# Patient Record
Sex: FEMALE | Employment: OTHER | URBAN - METROPOLITAN AREA
[De-identification: names, ages, dates, MRNs, and addresses within clinical notes are randomized per-mention and may not be internally consistent; named-entity substitution may affect disease eponyms.]

---

## 2023-03-18 DIAGNOSIS — I10 ESSENTIAL (PRIMARY) HYPERTENSION: ICD-10-CM

## 2023-03-19 DIAGNOSIS — I10 ESSENTIAL (PRIMARY) HYPERTENSION: ICD-10-CM

## 2023-03-20 RX ORDER — EPINEPHRINE 0.3 MG/.3ML
0.3 INJECTION SUBCUTANEOUS
COMMUNITY
Start: 2017-11-06 | End: 2023-10-26

## 2023-03-20 RX ORDER — CETIRIZINE HYDROCHLORIDE 10 MG/1
10 TABLET ORAL
COMMUNITY
Start: 2013-12-19 | End: 2024-01-09 | Stop reason: DRUGHIGH

## 2023-03-20 RX ORDER — FLAXSEED OIL 1000 MG
2 CAPSULE ORAL
COMMUNITY
Start: 2017-03-28 | End: 2024-01-09 | Stop reason: WASHOUT

## 2023-03-20 RX ORDER — METOPROLOL SUCCINATE 200 MG/1
200 TABLET, EXTENDED RELEASE ORAL DAILY
COMMUNITY
End: 2023-03-20 | Stop reason: SDUPTHER

## 2023-03-20 RX ORDER — METOPROLOL SUCCINATE 200 MG/1
TABLET, EXTENDED RELEASE ORAL
Qty: 90 TABLET | Refills: 3 | Status: SHIPPED | OUTPATIENT
Start: 2023-03-20 | End: 2024-01-08

## 2023-03-20 RX ORDER — CHLORTHALIDONE 25 MG/1
25 TABLET ORAL DAILY
COMMUNITY
End: 2023-03-20 | Stop reason: SDUPTHER

## 2023-03-20 RX ORDER — FLUTICASONE PROPIONATE 50 MCG
1 SPRAY, SUSPENSION (ML) NASAL DAILY
COMMUNITY
Start: 2017-03-28

## 2023-03-20 RX ORDER — BEPOTASTINE BESILATE 15 MG/ML
1 SOLUTION/ DROPS OPHTHALMIC 2 TIMES DAILY
COMMUNITY
Start: 2020-08-19 | End: 2024-01-09 | Stop reason: WASHOUT

## 2023-03-20 RX ORDER — LISINOPRIL 40 MG/1
20 TABLET ORAL 2 TIMES DAILY
COMMUNITY
End: 2023-07-17 | Stop reason: DRUGHIGH

## 2023-03-20 RX ORDER — HYDRALAZINE HYDROCHLORIDE 50 MG/1
50 TABLET, FILM COATED ORAL 3 TIMES DAILY
COMMUNITY
End: 2023-04-17

## 2023-03-20 RX ORDER — FUROSEMIDE 20 MG/1
1 TABLET ORAL DAILY
COMMUNITY
Start: 2022-04-10 | End: 2024-01-09 | Stop reason: WASHOUT

## 2023-03-20 RX ORDER — DOXYCYCLINE 100 MG/1
100 CAPSULE ORAL
COMMUNITY
Start: 2022-06-27

## 2023-03-20 RX ORDER — NYSTATIN 100000 [USP'U]/G
1 POWDER TOPICAL AS NEEDED
COMMUNITY
Start: 2022-08-09

## 2023-03-20 RX ORDER — LOTEPREDNOL ETABONATE 2 MG/ML
1 SUSPENSION/ DROPS OPHTHALMIC 2 TIMES DAILY
COMMUNITY
Start: 2020-08-19 | End: 2024-01-09 | Stop reason: WASHOUT

## 2023-03-20 RX ORDER — AMMONIUM LACTATE 12 G/100G
1 LOTION TOPICAL AS NEEDED
COMMUNITY
Start: 2022-08-09

## 2023-03-20 RX ORDER — KETOCONAZOLE 20 MG/G
1 CREAM TOPICAL DAILY
COMMUNITY
Start: 2022-08-09

## 2023-03-20 RX ORDER — DOCUSATE SODIUM 100 MG/1
100 CAPSULE, LIQUID FILLED ORAL
COMMUNITY
Start: 2013-12-19

## 2023-03-20 RX ORDER — AMLODIPINE BESYLATE 10 MG/1
10 TABLET ORAL DAILY
COMMUNITY
End: 2023-08-03

## 2023-03-20 RX ORDER — MONTELUKAST SODIUM 4 MG/1
2 TABLET, CHEWABLE ORAL DAILY
COMMUNITY
Start: 2018-04-02 | End: 2024-01-09 | Stop reason: WASHOUT

## 2023-03-20 RX ORDER — ATORVASTATIN CALCIUM 20 MG/1
1 TABLET, FILM COATED ORAL DAILY
COMMUNITY
Start: 2022-12-25

## 2023-03-20 RX ORDER — CHLORTHALIDONE 25 MG/1
TABLET ORAL
Qty: 90 TABLET | Refills: 1 | Status: SHIPPED | OUTPATIENT
Start: 2023-03-20 | End: 2023-10-30

## 2023-04-16 DIAGNOSIS — I10 ESSENTIAL (PRIMARY) HYPERTENSION: ICD-10-CM

## 2023-04-17 RX ORDER — HYDRALAZINE HYDROCHLORIDE 50 MG/1
TABLET, FILM COATED ORAL
Qty: 270 TABLET | Refills: 3 | Status: SHIPPED | OUTPATIENT
Start: 2023-04-17 | End: 2024-01-09 | Stop reason: DRUGHIGH

## 2023-07-17 ENCOUNTER — OFFICE VISIT (OUTPATIENT)
Dept: PRIMARY CARE | Facility: CLINIC | Age: 68
End: 2023-07-17
Payer: COMMERCIAL

## 2023-07-17 DIAGNOSIS — R73.03 PREDIABETES: ICD-10-CM

## 2023-07-17 DIAGNOSIS — I10 BENIGN ESSENTIAL HYPERTENSION: ICD-10-CM

## 2023-07-17 DIAGNOSIS — I10 PRIMARY HYPERTENSION: Primary | ICD-10-CM

## 2023-07-17 PROBLEM — H11.30 SUBCONJUNCTIVAL HEMORRHAGE: Status: ACTIVE | Noted: 2023-07-17

## 2023-07-17 PROBLEM — L71.9 ROSACEA: Status: ACTIVE | Noted: 2023-07-17

## 2023-07-17 PROBLEM — F43.9 STRESS: Status: ACTIVE | Noted: 2023-07-17

## 2023-07-17 PROBLEM — T78.40XA: Status: ACTIVE | Noted: 2023-07-17

## 2023-07-17 PROBLEM — R82.90 ABNORMAL FINDING IN URINE: Status: ACTIVE | Noted: 2023-07-17

## 2023-07-17 PROBLEM — R06.83 SNORING: Status: ACTIVE | Noted: 2023-07-17

## 2023-07-17 PROBLEM — E66.9 OBESITY: Status: ACTIVE | Noted: 2023-07-17

## 2023-07-17 PROBLEM — C43.62 MALIGNANT MELANOMA OF LEFT UPPER EXTREMITY INCLUDING SHOULDER (MULTI): Status: ACTIVE | Noted: 2023-07-17

## 2023-07-17 PROBLEM — C44.91 CARCINOMAS, BASAL CELL: Status: ACTIVE | Noted: 2023-07-17

## 2023-07-17 PROBLEM — M81.0 OSTEOPOROSIS: Status: ACTIVE | Noted: 2023-07-17

## 2023-07-17 PROBLEM — J01.00 SINUSITIS, ACUTE, MAXILLARY: Status: RESOLVED | Noted: 2023-07-17 | Resolved: 2023-07-17

## 2023-07-17 PROBLEM — E78.00 HYPERCHOLESTEROLEMIA: Status: ACTIVE | Noted: 2023-07-17

## 2023-07-17 PROBLEM — M85.80 OSTEOPENIA: Status: ACTIVE | Noted: 2023-07-17

## 2023-07-17 PROBLEM — E66.3 OVERWEIGHT: Status: ACTIVE | Noted: 2023-07-17

## 2023-07-17 PROBLEM — M17.9 OSTEOARTHRITIS OF KNEE: Status: ACTIVE | Noted: 2023-07-17

## 2023-07-17 PROBLEM — L73.2 HIDRADENITIS SUPPURATIVA: Status: RESOLVED | Noted: 2023-07-17 | Resolved: 2023-07-17

## 2023-07-17 PROBLEM — T78.2XXA ANAPHYLAXIS: Status: ACTIVE | Noted: 2023-07-17

## 2023-07-17 PROBLEM — N95.1 MENOPAUSAL HOT FLUSHES: Status: ACTIVE | Noted: 2023-07-17

## 2023-07-17 PROBLEM — L23.1 CONTACT DERMATITIS DUE TO ADHESIVES: Status: ACTIVE | Noted: 2023-07-17

## 2023-07-17 PROBLEM — L73.9 FOLLICULITIS: Status: RESOLVED | Noted: 2023-07-17 | Resolved: 2023-07-17

## 2023-07-17 PROBLEM — M72.2 PLANTAR FASCIITIS: Status: RESOLVED | Noted: 2023-07-17 | Resolved: 2023-07-17

## 2023-07-17 PROBLEM — R93.1 AGATSTON CORONARY ARTERY CALCIUM SCORE LESS THAN 100: Status: ACTIVE | Noted: 2023-07-17

## 2023-07-17 PROBLEM — J06.9 URTI (ACUTE UPPER RESPIRATORY INFECTION): Status: ACTIVE | Noted: 2023-07-17

## 2023-07-17 PROBLEM — B02.9 SHINGLES: Status: RESOLVED | Noted: 2023-07-17 | Resolved: 2023-07-17

## 2023-07-17 PROBLEM — L02.92 BOIL: Status: ACTIVE | Noted: 2023-07-17

## 2023-07-17 PROBLEM — M75.80 ROTATOR CUFF TENDONITIS: Status: RESOLVED | Noted: 2023-07-17 | Resolved: 2023-07-17

## 2023-07-17 PROBLEM — B37.2 CANDIDAL INTERTRIGO: Status: ACTIVE | Noted: 2023-07-17

## 2023-07-17 LAB
ALBUMIN (G/DL) IN SER/PLAS: 4.3 G/DL (ref 3.4–5)
ANION GAP IN SER/PLAS: 14 MMOL/L (ref 10–20)
CALCIUM (MG/DL) IN SER/PLAS: 9.4 MG/DL (ref 8.6–10.6)
CARBON DIOXIDE, TOTAL (MMOL/L) IN SER/PLAS: 28 MMOL/L (ref 21–32)
CHLORIDE (MMOL/L) IN SER/PLAS: 104 MMOL/L (ref 98–107)
CREATININE (MG/DL) IN SER/PLAS: 0.79 MG/DL (ref 0.5–1.05)
ESTIMATED AVERAGE GLUCOSE FOR HBA1C: 120 MG/DL
GFR FEMALE: 82 ML/MIN/1.73M2
GLUCOSE (MG/DL) IN SER/PLAS: 94 MG/DL (ref 74–99)
HEMOGLOBIN A1C/HEMOGLOBIN TOTAL IN BLOOD: 5.8 %
PHOSPHATE (MG/DL) IN SER/PLAS: 3.4 MG/DL (ref 2.5–4.9)
POTASSIUM (MMOL/L) IN SER/PLAS: 3.8 MMOL/L (ref 3.5–5.3)
SODIUM (MMOL/L) IN SER/PLAS: 142 MMOL/L (ref 136–145)
UREA NITROGEN (MG/DL) IN SER/PLAS: 23 MG/DL (ref 6–23)

## 2023-07-17 PROCEDURE — 3074F SYST BP LT 130 MM HG: CPT | Performed by: INTERNAL MEDICINE

## 2023-07-17 PROCEDURE — 1160F RVW MEDS BY RX/DR IN RCRD: CPT | Performed by: INTERNAL MEDICINE

## 2023-07-17 PROCEDURE — 3078F DIAST BP <80 MM HG: CPT | Performed by: INTERNAL MEDICINE

## 2023-07-17 PROCEDURE — 80069 RENAL FUNCTION PANEL: CPT

## 2023-07-17 PROCEDURE — 1036F TOBACCO NON-USER: CPT | Performed by: INTERNAL MEDICINE

## 2023-07-17 PROCEDURE — 99213 OFFICE O/P EST LOW 20 MIN: CPT | Performed by: INTERNAL MEDICINE

## 2023-07-17 PROCEDURE — 83036 HEMOGLOBIN GLYCOSYLATED A1C: CPT

## 2023-07-17 PROCEDURE — 1126F AMNT PAIN NOTED NONE PRSNT: CPT | Performed by: INTERNAL MEDICINE

## 2023-07-17 PROCEDURE — 1159F MED LIST DOCD IN RCRD: CPT | Performed by: INTERNAL MEDICINE

## 2023-07-17 RX ORDER — LISINOPRIL 40 MG/1
40 TABLET ORAL DAILY
Qty: 90 TABLET | Refills: 0 | Status: SHIPPED | OUTPATIENT
Start: 2023-07-17 | End: 2023-11-30

## 2023-07-17 NOTE — PROGRESS NOTES
Subjective   Patient ID: Frida Dailey is a 68 y.o. female who presents for Follow-up.  HPI  Cardiology  /52  No cardiovascular pulmonary or neurologic symptoms  Donated  blood site residual   Derm F/U   Mamm     Review of Systems   Constitutional:  Negative for fever.   Respiratory: Negative.  Negative for shortness of breath.    Cardiovascular:  Negative for chest pain, palpitations and leg swelling.       Objective   Physical Exam  Constitutional:       Appearance: Normal appearance. She is obese.   Neck:      Vascular: No carotid bruit.      Comments: No JVD or thyromegaly  Cardiovascular:      Rate and Rhythm: Normal rate and regular rhythm.      Heart sounds: Normal heart sounds. No murmur heard.  Pulmonary:      Effort: Pulmonary effort is normal.   Musculoskeletal:      Right lower leg: No edema.      Left lower leg: No edema.   Neurological:      General: No focal deficit present.      Mental Status: She is alert.   Psychiatric:         Mood and Affect: Mood normal.       /76   Pulse 72   Resp 14     Data  Cardiology consult 7/11/2023 reviewed-doing well  Sinusitis 5/24  Mammogram 4/4/2023 satisfactory  Hemoglobin A1c 5.8% on 1/28/2023    Assessment/Plan   Problem List Items Addressed This Visit       Benign essential hypertension - Primary    Relevant Orders    Renal Function Panel (Completed)    Prediabetes    Relevant Orders    Hemoglobin A1C (Completed)

## 2023-08-03 DIAGNOSIS — I10 ESSENTIAL (PRIMARY) HYPERTENSION: ICD-10-CM

## 2023-08-03 RX ORDER — AMLODIPINE BESYLATE 10 MG/1
10 TABLET ORAL DAILY
Qty: 90 TABLET | Refills: 3 | Status: SHIPPED | OUTPATIENT
Start: 2023-08-03

## 2023-09-12 VITALS — HEART RATE: 72 BPM | DIASTOLIC BLOOD PRESSURE: 76 MMHG | RESPIRATION RATE: 14 BRPM | SYSTOLIC BLOOD PRESSURE: 116 MMHG

## 2023-09-12 ASSESSMENT — ENCOUNTER SYMPTOMS
SHORTNESS OF BREATH: 0
FEVER: 0
PALPITATIONS: 0
RESPIRATORY NEGATIVE: 1

## 2023-10-19 DIAGNOSIS — T78.2XXA ANAPHYLACTIC SHOCK, UNSPECIFIED, INITIAL ENCOUNTER: ICD-10-CM

## 2023-10-26 RX ORDER — EPINEPHRINE 0.3 MG/.3ML
0.3 INJECTION SUBCUTANEOUS
Qty: 2 EACH | Refills: 2 | Status: SHIPPED | OUTPATIENT
Start: 2023-10-26

## 2023-10-30 DIAGNOSIS — I10 ESSENTIAL (PRIMARY) HYPERTENSION: ICD-10-CM

## 2023-10-30 RX ORDER — CHLORTHALIDONE 25 MG/1
TABLET ORAL
Qty: 90 TABLET | Refills: 1 | Status: SHIPPED | OUTPATIENT
Start: 2023-10-30

## 2023-10-31 ENCOUNTER — PATIENT MESSAGE (OUTPATIENT)
Dept: PRIMARY CARE | Facility: CLINIC | Age: 68
End: 2023-10-31

## 2023-11-30 DIAGNOSIS — I10 ESSENTIAL (PRIMARY) HYPERTENSION: ICD-10-CM

## 2023-11-30 RX ORDER — LISINOPRIL 40 MG/1
20 TABLET ORAL 2 TIMES DAILY
Qty: 90 TABLET | Refills: 3 | Status: SHIPPED | OUTPATIENT
Start: 2023-11-30 | End: 2024-01-09 | Stop reason: DRUGHIGH

## 2024-01-07 DIAGNOSIS — I10 ESSENTIAL (PRIMARY) HYPERTENSION: ICD-10-CM

## 2024-01-08 RX ORDER — METOPROLOL SUCCINATE 200 MG/1
TABLET, EXTENDED RELEASE ORAL
Qty: 90 TABLET | Refills: 3 | Status: SHIPPED | OUTPATIENT
Start: 2024-01-08

## 2024-01-09 ENCOUNTER — OFFICE VISIT (OUTPATIENT)
Dept: PRIMARY CARE | Facility: CLINIC | Age: 69
End: 2024-01-09
Payer: MEDICARE

## 2024-01-09 DIAGNOSIS — Z23 ENCOUNTER FOR IMMUNIZATION: ICD-10-CM

## 2024-01-09 DIAGNOSIS — R73.03 PREDIABETES: ICD-10-CM

## 2024-01-09 DIAGNOSIS — Z67.40 BLOOD TYPE O+: ICD-10-CM

## 2024-01-09 DIAGNOSIS — I10 ESSENTIAL (PRIMARY) HYPERTENSION: Primary | ICD-10-CM

## 2024-01-09 DIAGNOSIS — Z52.008 BLOOD DONOR: ICD-10-CM

## 2024-01-09 DIAGNOSIS — I10 PRIMARY HYPERTENSION: ICD-10-CM

## 2024-01-09 PROCEDURE — 3078F DIAST BP <80 MM HG: CPT | Performed by: INTERNAL MEDICINE

## 2024-01-09 PROCEDURE — 1126F AMNT PAIN NOTED NONE PRSNT: CPT | Performed by: INTERNAL MEDICINE

## 2024-01-09 PROCEDURE — G0009 ADMIN PNEUMOCOCCAL VACCINE: HCPCS | Performed by: INTERNAL MEDICINE

## 2024-01-09 PROCEDURE — 3074F SYST BP LT 130 MM HG: CPT | Performed by: INTERNAL MEDICINE

## 2024-01-09 PROCEDURE — 1160F RVW MEDS BY RX/DR IN RCRD: CPT | Performed by: INTERNAL MEDICINE

## 2024-01-09 PROCEDURE — 1036F TOBACCO NON-USER: CPT | Performed by: INTERNAL MEDICINE

## 2024-01-09 PROCEDURE — 90677 PCV20 VACCINE IM: CPT | Performed by: INTERNAL MEDICINE

## 2024-01-09 PROCEDURE — 1159F MED LIST DOCD IN RCRD: CPT | Performed by: INTERNAL MEDICINE

## 2024-01-09 PROCEDURE — 99214 OFFICE O/P EST MOD 30 MIN: CPT | Performed by: INTERNAL MEDICINE

## 2024-01-09 RX ORDER — CHOLECALCIFEROL (VITAMIN D3) 25 MCG
1000 TABLET ORAL
COMMUNITY
Start: 2011-06-06

## 2024-01-09 RX ORDER — FLAXSEED OIL 1000 MG
1000 CAPSULE ORAL 2 TIMES DAILY
COMMUNITY
Start: 2015-03-21

## 2024-01-09 RX ORDER — HYDRALAZINE HYDROCHLORIDE 50 MG/1
50 TABLET, FILM COATED ORAL 2 TIMES DAILY
COMMUNITY
Start: 2021-11-30 | End: 2024-05-28

## 2024-01-09 RX ORDER — FUROSEMIDE 20 MG/1
20 TABLET ORAL 2 TIMES DAILY
COMMUNITY
End: 2024-01-09 | Stop reason: ALTCHOICE

## 2024-01-09 RX ORDER — ASPIRIN 81 MG/1
81 TABLET ORAL
COMMUNITY
Start: 2022-07-21

## 2024-01-09 RX ORDER — CETIRIZINE HYDROCHLORIDE 10 MG/1
10 TABLET ORAL DAILY
Status: SHIPPED
Start: 2024-01-09

## 2024-01-09 RX ORDER — LISINOPRIL 40 MG/1
40 TABLET ORAL DAILY
Qty: 90 TABLET | Refills: 3 | Status: SHIPPED
Start: 2024-01-09

## 2024-01-09 RX ORDER — AMLODIPINE BESYLATE 10 MG/1
1 TABLET ORAL DAILY
COMMUNITY
Start: 2023-05-07 | End: 2024-01-30 | Stop reason: SDUPTHER

## 2024-01-09 NOTE — PROGRESS NOTES
"Subjective   Patient ID: Frida Dailey \"June\" is a 68 y.o. female who presents for Hypertension.  Hypertension      Meds review   Docusate sodium every other day   O+ blood type deferred donor   126/76    ARC /80    Review of Systems   Respiratory: Negative.     Cardiovascular: Negative.    Gastrointestinal: Negative.    Neurological: Negative.        Objective   Physical Exam  Constitutional:       Appearance: Normal appearance. She is obese.   Neck:      Vascular: No carotid bruit.      Comments: No JVD or thyromegaly  Cardiovascular:      Rate and Rhythm: Normal rate and regular rhythm.      Heart sounds: Normal heart sounds. No murmur heard.  Pulmonary:      Effort: Pulmonary effort is normal.   Abdominal:      General: Bowel sounds are normal.      Palpations: Abdomen is soft.      Tenderness: There is no abdominal tenderness.   Musculoskeletal:      Right lower leg: No edema.      Left lower leg: No edema.   Neurological:      General: No focal deficit present.      Mental Status: She is alert.   Psychiatric:         Mood and Affect: Mood normal.       /76   Pulse 74   Resp 16     Data  Fit test 8/24/2023 negative  CBC 1/28/2023 H/H equals 12/38%, MCV 96  Hemoglobin A1c 5.8%  Colonoscopy 2019 5 polyps hyperplastic and diverticulosis    Assessment/Plan     Deferred donor-check CBC and iron levels  Reviewed prior colonoscopy  Hypertension doing well on current therapy  Prediabetes check hemoglobin A1c  Immunizations reviewed, administer Prevnar 20    Problem List Items Addressed This Visit       Essential (primary) hypertension - Primary    Relevant Medications    lisinopril 40 mg tablet    Other Relevant Orders    Iron and TIBC (Completed)    Prediabetes    Relevant Orders    Hemoglobin A1C (Completed)    TSH with reflex to Free T4 if abnormal (Completed)    Encounter for immunization     Other Visit Diagnoses       Blood donor        Relevant Orders    CBC and Auto Differential (Completed)    " Iron and TIBC (Completed)    Blood type O+

## 2024-01-29 ENCOUNTER — LAB (OUTPATIENT)
Dept: LAB | Facility: LAB | Age: 69
End: 2024-01-29
Payer: MEDICARE

## 2024-01-29 DIAGNOSIS — I10 PRIMARY HYPERTENSION: ICD-10-CM

## 2024-01-29 DIAGNOSIS — I10 ESSENTIAL (PRIMARY) HYPERTENSION: ICD-10-CM

## 2024-01-29 DIAGNOSIS — R73.03 PREDIABETES: ICD-10-CM

## 2024-01-29 DIAGNOSIS — Z52.008 BLOOD DONOR: ICD-10-CM

## 2024-01-29 LAB
ALBUMIN SERPL BCP-MCNC: 4.2 G/DL (ref 3.4–5)
ANION GAP SERPL CALC-SCNC: 13 MMOL/L (ref 10–20)
BUN SERPL-MCNC: 21 MG/DL (ref 6–23)
CALCIUM SERPL-MCNC: 9.8 MG/DL (ref 8.6–10.6)
CHLORIDE SERPL-SCNC: 102 MMOL/L (ref 98–107)
CHOLEST SERPL-MCNC: 140 MG/DL (ref 0–199)
CHOLESTEROL/HDL RATIO: 3.5
CO2 SERPL-SCNC: 30 MMOL/L (ref 21–32)
CREAT SERPL-MCNC: 0.89 MG/DL (ref 0.5–1.05)
EGFRCR SERPLBLD CKD-EPI 2021: 71 ML/MIN/1.73M*2
GLUCOSE SERPL-MCNC: 121 MG/DL (ref 74–99)
HDLC SERPL-MCNC: 39.9 MG/DL
IRON SATN MFR SERPL: 28 % (ref 25–45)
IRON SERPL-MCNC: 99 UG/DL (ref 35–150)
LDLC SERPL CALC-MCNC: 74 MG/DL
NON HDL CHOLESTEROL: 100 MG/DL (ref 0–149)
PHOSPHATE SERPL-MCNC: 3.7 MG/DL (ref 2.5–4.9)
POTASSIUM SERPL-SCNC: 3.7 MMOL/L (ref 3.5–5.3)
SODIUM SERPL-SCNC: 141 MMOL/L (ref 136–145)
TIBC SERPL-MCNC: 355 UG/DL (ref 240–445)
TRIGL SERPL-MCNC: 132 MG/DL (ref 0–149)
TSH SERPL-ACNC: 1.74 MIU/L (ref 0.44–3.98)
UIBC SERPL-MCNC: 256 UG/DL (ref 110–370)
VLDL: 26 MG/DL (ref 0–40)

## 2024-01-29 PROCEDURE — 36415 COLL VENOUS BLD VENIPUNCTURE: CPT

## 2024-01-29 PROCEDURE — 80061 LIPID PANEL: CPT

## 2024-01-29 PROCEDURE — 84443 ASSAY THYROID STIM HORMONE: CPT

## 2024-01-29 PROCEDURE — 83550 IRON BINDING TEST: CPT

## 2024-01-29 PROCEDURE — 83036 HEMOGLOBIN GLYCOSYLATED A1C: CPT

## 2024-01-29 PROCEDURE — 83540 ASSAY OF IRON: CPT

## 2024-01-29 PROCEDURE — 85025 COMPLETE CBC W/AUTO DIFF WBC: CPT

## 2024-01-29 PROCEDURE — 80069 RENAL FUNCTION PANEL: CPT

## 2024-01-30 VITALS — SYSTOLIC BLOOD PRESSURE: 126 MMHG | DIASTOLIC BLOOD PRESSURE: 76 MMHG | RESPIRATION RATE: 16 BRPM | HEART RATE: 74 BPM

## 2024-01-30 LAB
BASOPHILS # BLD AUTO: 0.02 X10*3/UL (ref 0–0.1)
BASOPHILS NFR BLD AUTO: 0.3 %
EOSINOPHIL # BLD AUTO: 0.05 X10*3/UL (ref 0–0.7)
EOSINOPHIL NFR BLD AUTO: 0.8 %
ERYTHROCYTE [DISTWIDTH] IN BLOOD BY AUTOMATED COUNT: 13.2 % (ref 11.5–14.5)
EST. AVERAGE GLUCOSE BLD GHB EST-MCNC: 123 MG/DL
HBA1C MFR BLD: 5.9 %
HCT VFR BLD AUTO: 38.9 % (ref 36–46)
HGB BLD-MCNC: 12 G/DL (ref 12–16)
IMM GRANULOCYTES # BLD AUTO: 0.02 X10*3/UL (ref 0–0.7)
IMM GRANULOCYTES NFR BLD AUTO: 0.3 % (ref 0–0.9)
LYMPHOCYTES # BLD AUTO: 1.46 X10*3/UL (ref 1.2–4.8)
LYMPHOCYTES NFR BLD AUTO: 22.7 %
MCH RBC QN AUTO: 29.9 PG (ref 26–34)
MCHC RBC AUTO-ENTMCNC: 30.8 G/DL (ref 32–36)
MCV RBC AUTO: 97 FL (ref 80–100)
MONOCYTES # BLD AUTO: 0.29 X10*3/UL (ref 0.1–1)
MONOCYTES NFR BLD AUTO: 4.5 %
NEUTROPHILS # BLD AUTO: 4.6 X10*3/UL (ref 1.2–7.7)
NEUTROPHILS NFR BLD AUTO: 71.4 %
NRBC BLD-RTO: 0 /100 WBCS (ref 0–0)
PLATELET # BLD AUTO: 211 X10*3/UL (ref 150–450)
RBC # BLD AUTO: 4.01 X10*6/UL (ref 4–5.2)
WBC # BLD AUTO: 6.4 X10*3/UL (ref 4.4–11.3)

## 2024-01-30 ASSESSMENT — ENCOUNTER SYMPTOMS
CARDIOVASCULAR NEGATIVE: 1
NEUROLOGICAL NEGATIVE: 1
HYPERTENSION: 1
GASTROINTESTINAL NEGATIVE: 1
RESPIRATORY NEGATIVE: 1

## 2024-05-26 DIAGNOSIS — I10 BENIGN ESSENTIAL HYPERTENSION: Primary | ICD-10-CM

## 2024-05-28 RX ORDER — HYDRALAZINE HYDROCHLORIDE 50 MG/1
50 TABLET, FILM COATED ORAL 3 TIMES DAILY
Qty: 270 TABLET | Refills: 1 | Status: SHIPPED | OUTPATIENT
Start: 2024-05-28

## 2024-06-23 DIAGNOSIS — I10 ESSENTIAL (PRIMARY) HYPERTENSION: ICD-10-CM

## 2024-06-24 RX ORDER — AMLODIPINE BESYLATE 10 MG/1
10 TABLET ORAL DAILY
Qty: 90 TABLET | Refills: 1 | Status: SHIPPED | OUTPATIENT
Start: 2024-06-24

## 2024-07-16 ENCOUNTER — APPOINTMENT (OUTPATIENT)
Dept: PRIMARY CARE | Facility: CLINIC | Age: 69
End: 2024-07-16
Payer: MEDICARE

## 2024-07-16 DIAGNOSIS — Z11.59 ENCOUNTER FOR HEPATITIS C SCREENING TEST FOR LOW RISK PATIENT: ICD-10-CM

## 2024-07-16 DIAGNOSIS — Z00.00 HEALTHCARE MAINTENANCE: ICD-10-CM

## 2024-07-16 DIAGNOSIS — K57.90 DIVERTICULOSIS: ICD-10-CM

## 2024-07-16 DIAGNOSIS — R73.03 PREDIABETES: ICD-10-CM

## 2024-07-16 DIAGNOSIS — K63.5 HYPERPLASTIC COLONIC POLYP, UNSPECIFIED PART OF COLON: ICD-10-CM

## 2024-07-16 DIAGNOSIS — M70.40 PREPATELLAR BURSITIS, UNSPECIFIED LATERALITY: Primary | ICD-10-CM

## 2024-07-16 NOTE — PROGRESS NOTES
"Subjective   Patient ID: Frida Anahi Dailey \"June\" is a 69 y.o. female who presents for Follow-up.  HPI  Kneeling bothersome pressure   No injury overuse  Interval colonoscopy  40 mg       Review of Systems   Constitutional:  Negative for fever.   Respiratory: Negative.     Cardiovascular: Negative.    Musculoskeletal: Negative.         As HPI   Neurological: Negative.        Objective   Physical Exam  Constitutional:       Appearance: Normal appearance. She is obese.   Neck:      Vascular: No carotid bruit.      Comments: No JVD or thyromegaly  Cardiovascular:      Rate and Rhythm: Normal rate and regular rhythm.      Heart sounds: Normal heart sounds. No murmur heard.  Pulmonary:      Effort: Pulmonary effort is normal.   Musculoskeletal:         General: No swelling.      Right lower leg: No edema.      Left lower leg: No edema.      Comments: Knees T0 L0 S0, F ROM   Neurological:      General: No focal deficit present.      Mental Status: She is alert.   Psychiatric:         Mood and Affect: Mood normal.       /70   Pulse 74   Wt 123 kg (272 lb)   BMI 41.97 kg/m²     Data  Mammogram 6/7/2024 negative  Colonoscopy 5/8/2024-colon polyp hyperplastic, diverticulosis    Assessment/Plan     Problem List Items Addressed This Visit       Prediabetes    Relevant Orders    Hemoglobin A1c    Encounter for hepatitis C screening test for low risk patient    Relevant Orders    Hepatitis C Antibody    Prepatellar bursitis - Primary    Hyperplastic colonic polyp    Diverticulosis     Other Visit Diagnoses       Healthcare maintenance                   "

## 2024-07-21 DIAGNOSIS — I10 ESSENTIAL (PRIMARY) HYPERTENSION: ICD-10-CM

## 2024-07-22 RX ORDER — CHLORTHALIDONE 25 MG/1
TABLET ORAL
Qty: 30 TABLET | Refills: 2 | Status: SHIPPED | OUTPATIENT
Start: 2024-07-22

## 2024-08-10 VITALS
HEART RATE: 74 BPM | SYSTOLIC BLOOD PRESSURE: 126 MMHG | WEIGHT: 272 LBS | BODY MASS INDEX: 41.97 KG/M2 | DIASTOLIC BLOOD PRESSURE: 70 MMHG

## 2024-08-10 PROBLEM — Z11.59 ENCOUNTER FOR HEPATITIS C SCREENING TEST FOR LOW RISK PATIENT: Status: ACTIVE | Noted: 2024-08-10

## 2024-08-10 PROBLEM — K57.90 DIVERTICULOSIS: Status: ACTIVE | Noted: 2024-08-10

## 2024-08-10 PROBLEM — K63.5 HYPERPLASTIC COLONIC POLYP: Status: ACTIVE | Noted: 2024-08-10

## 2024-08-10 PROBLEM — M70.40 PREPATELLAR BURSITIS: Status: ACTIVE | Noted: 2024-08-10

## 2024-08-10 ASSESSMENT — ENCOUNTER SYMPTOMS
MUSCULOSKELETAL NEGATIVE: 1
FEVER: 0
CARDIOVASCULAR NEGATIVE: 1
RESPIRATORY NEGATIVE: 1
NEUROLOGICAL NEGATIVE: 1

## 2024-08-25 DIAGNOSIS — I10 ESSENTIAL (PRIMARY) HYPERTENSION: ICD-10-CM

## 2024-08-26 RX ORDER — CHLORTHALIDONE 25 MG/1
TABLET ORAL
Qty: 90 TABLET | Refills: 3 | Status: SHIPPED | OUTPATIENT
Start: 2024-08-26

## 2024-09-11 ENCOUNTER — PATIENT MESSAGE (OUTPATIENT)
Dept: PRIMARY CARE | Facility: CLINIC | Age: 69
End: 2024-09-11

## 2024-09-13 DIAGNOSIS — Z23 NEED FOR COVID-19 VACCINE: Primary | ICD-10-CM

## 2024-09-13 DIAGNOSIS — Z23 COVID-19 VACCINE ADMINISTERED: ICD-10-CM

## 2024-11-14 ENCOUNTER — TELEPHONE (OUTPATIENT)
Dept: PRIMARY CARE | Facility: CLINIC | Age: 69
End: 2024-11-14
Payer: MEDICARE

## 2024-11-21 ENCOUNTER — PATIENT MESSAGE (OUTPATIENT)
Dept: PRIMARY CARE | Facility: CLINIC | Age: 69
End: 2024-11-21

## 2024-11-21 ENCOUNTER — APPOINTMENT (OUTPATIENT)
Dept: PRIMARY CARE | Facility: CLINIC | Age: 69
End: 2024-11-21
Payer: MEDICARE

## 2024-11-21 VITALS
HEART RATE: 61 BPM | BODY MASS INDEX: 39.4 KG/M2 | SYSTOLIC BLOOD PRESSURE: 113 MMHG | WEIGHT: 260 LBS | DIASTOLIC BLOOD PRESSURE: 68 MMHG | HEIGHT: 68 IN | TEMPERATURE: 97.9 F | OXYGEN SATURATION: 98 % | RESPIRATION RATE: 20 BRPM

## 2024-11-21 DIAGNOSIS — R22.0 FACIAL SWELLING: Primary | ICD-10-CM

## 2024-11-21 DIAGNOSIS — I10 ESSENTIAL (PRIMARY) HYPERTENSION: ICD-10-CM

## 2024-11-21 PROCEDURE — 1159F MED LIST DOCD IN RCRD: CPT | Performed by: INTERNAL MEDICINE

## 2024-11-21 PROCEDURE — 99213 OFFICE O/P EST LOW 20 MIN: CPT | Performed by: INTERNAL MEDICINE

## 2024-11-21 PROCEDURE — 3008F BODY MASS INDEX DOCD: CPT | Performed by: INTERNAL MEDICINE

## 2024-11-21 PROCEDURE — 3078F DIAST BP <80 MM HG: CPT | Performed by: INTERNAL MEDICINE

## 2024-11-21 PROCEDURE — 3074F SYST BP LT 130 MM HG: CPT | Performed by: INTERNAL MEDICINE

## 2024-11-21 PROCEDURE — 1036F TOBACCO NON-USER: CPT | Performed by: INTERNAL MEDICINE

## 2024-11-21 ASSESSMENT — PATIENT HEALTH QUESTIONNAIRE - PHQ9
1. LITTLE INTEREST OR PLEASURE IN DOING THINGS: NOT AT ALL
2. FEELING DOWN, DEPRESSED OR HOPELESS: NOT AT ALL
SUM OF ALL RESPONSES TO PHQ9 QUESTIONS 1 AND 2: 0

## 2024-11-21 NOTE — PROGRESS NOTES
"Subjective   Patient ID: Frida Dailey \"June\" is a 69 y.o. female who presents for Allergic Reaction.  HPI  R face lip swelling 6 hours to left   Business meeting   Dental surgery   Ibuprofen   Immunizations reviewed, acknowledge flu shot/COVID-19 9/23/2024  Zyrtec  referral to allergist     Review of Systems   Constitutional: Negative.  Negative for fever.   HENT:  Positive for dental problem and facial swelling. Negative for sinus pressure, sore throat, trouble swallowing and voice change.    Eyes:  Negative for discharge and visual disturbance.   Respiratory: Negative.     Cardiovascular: Negative.    Skin:  Negative for rash.       Objective   Physical Exam  Constitutional:       Appearance: Normal appearance. She is obese.   HENT:      Head: Normocephalic.      Comments: No overt facial swelling/rash     Nose: No congestion.      Mouth/Throat:      Mouth: Mucous membranes are moist.      Pharynx: Oropharynx is clear.   Eyes:      Conjunctiva/sclera: Conjunctivae normal.   Neck:      Comments: No JVD or thyromegaly  Cardiovascular:      Rate and Rhythm: Normal rate and regular rhythm.      Heart sounds: Normal heart sounds. No murmur heard.  Pulmonary:      Effort: Pulmonary effort is normal.   Abdominal:      General: Bowel sounds are normal.      Palpations: Abdomen is soft.      Tenderness: There is no abdominal tenderness.   Musculoskeletal:      Right lower leg: No edema.      Left lower leg: No edema.   Lymphadenopathy:      Cervical: No cervical adenopathy.   Skin:     Findings: No rash.   Neurological:      General: No focal deficit present.      Mental Status: She is alert.   Psychiatric:         Mood and Affect: Mood normal.       /68 (BP Location: Right arm, Patient Position: Sitting, BP Cuff Size: Adult)   Pulse 61   Temp 36.6 °C (97.9 °F) (Oral)   Resp 20   Ht 1.727 m (5' 8\")   Wt 118 kg (260 lb)   SpO2 98%   BMI 39.53 kg/m²     Data  Urgent care center 11/13/2024 " reviewed  Dermatology consult 10/10/2024  Lab 9/11 lipid at target levels, HDL-C 39  1/29 A1c 5.9%, glucose 121, creatinine 0.9/GFR 71, H/H 12/39    Assessment/Plan   Facial swelling question angioedema or allergic reaction  Blood pressure doing well, consider angioedema secondary to lisinopril, discontinue and follow-up blood pressure  Zyrtec as needed, referral to allergist   Has epinephrine injection for systemic reaction if needed  Hypertension well  Immunizations reviewed  Problem List Items Addressed This Visit       Essential (primary) hypertension    Facial swelling - Primary

## 2024-12-19 ENCOUNTER — APPOINTMENT (OUTPATIENT)
Dept: PRIMARY CARE | Facility: CLINIC | Age: 69
End: 2024-12-19
Payer: MEDICARE

## 2024-12-19 DIAGNOSIS — T78.3XXD ANGIOEDEMA, SUBSEQUENT ENCOUNTER: ICD-10-CM

## 2024-12-19 DIAGNOSIS — R22.0 FACIAL SWELLING: Primary | ICD-10-CM

## 2024-12-19 DIAGNOSIS — I10 ESSENTIAL (PRIMARY) HYPERTENSION: ICD-10-CM

## 2024-12-19 PROCEDURE — 3008F BODY MASS INDEX DOCD: CPT | Performed by: INTERNAL MEDICINE

## 2024-12-19 PROCEDURE — 3078F DIAST BP <80 MM HG: CPT | Performed by: INTERNAL MEDICINE

## 2024-12-19 PROCEDURE — 3075F SYST BP GE 130 - 139MM HG: CPT | Performed by: INTERNAL MEDICINE

## 2024-12-19 PROCEDURE — 1036F TOBACCO NON-USER: CPT | Performed by: INTERNAL MEDICINE

## 2024-12-19 PROCEDURE — 1159F MED LIST DOCD IN RCRD: CPT | Performed by: INTERNAL MEDICINE

## 2024-12-19 PROCEDURE — 99213 OFFICE O/P EST LOW 20 MIN: CPT | Performed by: INTERNAL MEDICINE

## 2024-12-19 PROCEDURE — 1160F RVW MEDS BY RX/DR IN RCRD: CPT | Performed by: INTERNAL MEDICINE

## 2024-12-19 RX ORDER — INFLUENZA A VIRUS A/VICTORIA/4897/2022 IVR-238 (H1N1) ANTIGEN (FORMALDEHYDE INACTIVATED), INFLUENZA A VIRUS A/THAILAND/8/2022 IVR-237 (H3N2) ANTIGEN (FORMALDEHYDE INACTIVATED), INFLUENZA B VIRUS B/AUSTRIA/1359417/2021 BVR-26 ANTIGEN (FORMALDEHYDE INACTIVATED) 15; 15; 15 UG/.5ML; UG/.5ML; UG/.5ML
INJECTION, SUSPENSION INTRAMUSCULAR
COMMUNITY
Start: 2024-09-23

## 2024-12-19 ASSESSMENT — PATIENT HEALTH QUESTIONNAIRE - PHQ9
2. FEELING DOWN, DEPRESSED OR HOPELESS: NOT AT ALL
1. LITTLE INTEREST OR PLEASURE IN DOING THINGS: NOT AT ALL
SUM OF ALL RESPONSES TO PHQ9 QUESTIONS 1 AND 2: 0

## 2024-12-19 NOTE — PROGRESS NOTES
"Subjective   Patient ID: June Anahi Dailey \"June\" is a 69 y.o. female who presents for Follow-up.  HPI  Check /76  Stopped lisinopril due to facial swelling  No recurrence    Review of Systems   Constitutional: Negative.  Negative for fever.   HENT:  Negative for facial swelling, sinus pressure, sore throat, trouble swallowing and voice change.    Respiratory: Negative.     Cardiovascular: Negative.    Neurological: Negative.        Objective   Physical Exam  Constitutional:       General: She is not in acute distress.  HENT:      Head:      Comments: No facial swelling     Mouth/Throat:      Pharynx: Oropharynx is clear.   Cardiovascular:      Rate and Rhythm: Normal rate and regular rhythm.      Pulses: Normal pulses.      Heart sounds: Normal heart sounds.   Pulmonary:      Effort: Pulmonary effort is normal.      Breath sounds: Normal breath sounds.   Skin:     Findings: No rash.   Neurological:      General: No focal deficit present.      Mental Status: She is alert.       /76   Pulse 57   Temp 36.6 °C (97.8 °F) (Oral)   Resp 20   Ht 1.727 m (5' 8\")   Wt 117 kg (259 lb)   SpO2 97%   BMI 39.38 kg/m²       Assessment/Plan     Hypertension satisfactory off lisinopril, probable angioedema by history  No facial swelling  Problem List Items Addressed This Visit       Essential (primary) hypertension    Facial swelling - Primary     Other Visit Diagnoses       Angioedema, subsequent encounter                   "

## 2025-01-11 DIAGNOSIS — I10 ESSENTIAL (PRIMARY) HYPERTENSION: ICD-10-CM

## 2025-01-14 RX ORDER — AMLODIPINE BESYLATE 10 MG/1
10 TABLET ORAL DAILY
Qty: 90 TABLET | Refills: 1 | Status: SHIPPED | OUTPATIENT
Start: 2025-01-14

## 2025-01-19 DIAGNOSIS — I10 ESSENTIAL (PRIMARY) HYPERTENSION: ICD-10-CM

## 2025-01-19 DIAGNOSIS — I10 BENIGN ESSENTIAL HYPERTENSION: ICD-10-CM

## 2025-01-20 RX ORDER — HYDRALAZINE HYDROCHLORIDE 50 MG/1
50 TABLET, FILM COATED ORAL 3 TIMES DAILY
Qty: 270 TABLET | Refills: 1 | Status: SHIPPED | OUTPATIENT
Start: 2025-01-20

## 2025-01-20 RX ORDER — METOPROLOL SUCCINATE 200 MG/1
TABLET, EXTENDED RELEASE ORAL
Qty: 90 TABLET | Refills: 3 | Status: SHIPPED | OUTPATIENT
Start: 2025-01-20

## 2025-02-05 VITALS
OXYGEN SATURATION: 97 % | WEIGHT: 259 LBS | DIASTOLIC BLOOD PRESSURE: 76 MMHG | RESPIRATION RATE: 20 BRPM | HEIGHT: 68 IN | BODY MASS INDEX: 39.25 KG/M2 | TEMPERATURE: 97.8 F | HEART RATE: 57 BPM | SYSTOLIC BLOOD PRESSURE: 140 MMHG

## 2025-02-05 PROBLEM — C44.319 BASAL CELL CARCINOMA OF SKIN OF OTHER PARTS OF FACE: Status: ACTIVE | Noted: 2022-08-22

## 2025-02-05 PROBLEM — L02.92 FURUNCLE: Status: RESOLVED | Noted: 2023-07-17 | Resolved: 2025-02-05

## 2025-02-05 PROBLEM — R22.0 FACIAL SWELLING: Status: ACTIVE | Noted: 2025-02-05

## 2025-02-05 PROBLEM — H11.30 SUBCONJUNCTIVAL HEMORRHAGE: Status: RESOLVED | Noted: 2023-07-17 | Resolved: 2025-02-05

## 2025-02-05 PROBLEM — N95.1 HOT FLASHES DUE TO MENOPAUSE: Status: RESOLVED | Noted: 2023-07-17 | Resolved: 2025-02-05

## 2025-02-05 PROBLEM — M17.9 OSTEOARTHRITIS OF KNEE: Status: RESOLVED | Noted: 2023-07-17 | Resolved: 2025-02-05

## 2025-02-05 PROBLEM — L71.8 OCULAR ROSACEA: Status: ACTIVE | Noted: 2017-06-09

## 2025-02-05 PROBLEM — J06.9 URTI (ACUTE UPPER RESPIRATORY INFECTION): Status: RESOLVED | Noted: 2023-07-17 | Resolved: 2025-02-05

## 2025-02-05 PROBLEM — L73.2 HIDRADENITIS SUPPURATIVA: Status: ACTIVE | Noted: 2023-07-17

## 2025-02-05 PROBLEM — L25.3 CHEMICAL DERMATITIS: Status: ACTIVE | Noted: 2023-07-17

## 2025-02-05 PROBLEM — L73.9 FOLLICULITIS: Status: ACTIVE | Noted: 2023-07-17

## 2025-02-05 PROBLEM — C43.60 MALIGNANT MELANOMA OF UPPER EXTREMITY (MULTI): Status: ACTIVE | Noted: 2021-11-15

## 2025-02-05 PROBLEM — B02.9 HERPES ZOSTER: Status: ACTIVE | Noted: 2023-07-17

## 2025-02-05 PROBLEM — F43.23 ADJUSTMENT DISORDER WITH MIXED ANXIETY AND DEPRESSED MOOD: Status: RESOLVED | Noted: 2023-07-17 | Resolved: 2025-02-05

## 2025-02-05 ASSESSMENT — ENCOUNTER SYMPTOMS
CONSTITUTIONAL NEGATIVE: 1
RESPIRATORY NEGATIVE: 1
SORE THROAT: 0
VOICE CHANGE: 0
CARDIOVASCULAR NEGATIVE: 1
CARDIOVASCULAR NEGATIVE: 1
VOICE CHANGE: 0
SORE THROAT: 0
CONSTITUTIONAL NEGATIVE: 1
EYE DISCHARGE: 0
FEVER: 0
NEUROLOGICAL NEGATIVE: 1
TROUBLE SWALLOWING: 0
FEVER: 0
FACIAL SWELLING: 1
TROUBLE SWALLOWING: 0
RESPIRATORY NEGATIVE: 1
SINUS PRESSURE: 0
SINUS PRESSURE: 0
FACIAL SWELLING: 0

## 2025-03-25 ENCOUNTER — APPOINTMENT (OUTPATIENT)
Dept: PRIMARY CARE | Facility: CLINIC | Age: 70
End: 2025-03-25
Payer: MEDICARE

## 2025-03-25 VITALS
SYSTOLIC BLOOD PRESSURE: 130 MMHG | HEIGHT: 68 IN | OXYGEN SATURATION: 98 % | BODY MASS INDEX: 41.68 KG/M2 | DIASTOLIC BLOOD PRESSURE: 70 MMHG | WEIGHT: 275 LBS | HEART RATE: 56 BPM | RESPIRATION RATE: 16 BRPM

## 2025-03-25 DIAGNOSIS — E78.00 HYPERCHOLESTEROLEMIA: ICD-10-CM

## 2025-03-25 DIAGNOSIS — R73.03 PREDIABETES: ICD-10-CM

## 2025-03-25 DIAGNOSIS — T78.3XXD ANGIOEDEMA, SUBSEQUENT ENCOUNTER: ICD-10-CM

## 2025-03-25 DIAGNOSIS — E66.01 SEVERE OBESITY (BMI >= 40) (MULTI): ICD-10-CM

## 2025-03-25 DIAGNOSIS — C44.319 BASAL CELL CARCINOMA OF SKIN OF OTHER PARTS OF FACE: ICD-10-CM

## 2025-03-25 DIAGNOSIS — I10 BENIGN ESSENTIAL HYPERTENSION: ICD-10-CM

## 2025-03-25 DIAGNOSIS — Z11.59 ENCOUNTER FOR HEPATITIS C SCREENING TEST FOR LOW RISK PATIENT: ICD-10-CM

## 2025-03-25 DIAGNOSIS — Z00.00 ROUTINE GENERAL MEDICAL EXAMINATION AT A HEALTH CARE FACILITY: Primary | ICD-10-CM

## 2025-03-25 DIAGNOSIS — R00.1 SINUS BRADYCARDIA: ICD-10-CM

## 2025-03-25 DIAGNOSIS — C43.60 MALIGNANT MELANOMA OF UPPER EXTREMITY, UNSPECIFIED LATERALITY: ICD-10-CM

## 2025-03-25 PROCEDURE — 93000 ELECTROCARDIOGRAM COMPLETE: CPT | Performed by: INTERNAL MEDICINE

## 2025-03-25 RX ORDER — HYDRALAZINE HYDROCHLORIDE 50 MG/1
50 TABLET, FILM COATED ORAL 2 TIMES DAILY
Start: 2025-03-25

## 2025-03-25 ASSESSMENT — ENCOUNTER SYMPTOMS
OCCASIONAL FEELINGS OF UNSTEADINESS: 0
LOSS OF SENSATION IN FEET: 0
DEPRESSION: 0

## 2025-03-25 NOTE — ASSESSMENT & PLAN NOTE
Orders:    ECG 12 lead (Clinic Performed)    Renal Function Panel; Future    Alanine Aminotransferase; Future    Aspartate Aminotransferase; Future    CBC and Auto Differential; Future    Urinalysis with Reflex Microscopic; Future

## 2025-03-25 NOTE — PATIENT INSTRUCTIONS
Overall you are in good health today; age and gender appropriate wellness services reviewed  You have no clinical evidence of active heart disease, risk factors are at target levels (glucose, BP, Cholesterol)  Sinus bradycardia slow natural pacemaker rhythm heart, indicative of healthy heart function  Prior coronary artery calcium CT score associated with lower risk of coronary heart disease  Age and gender appropriate cancer screening prevention will be up-to-date with June mammogram  Colonoscopy colon polyps all hyperplastic without precancerous potential and diverticulosis  Skin care including squamous cell cancer and melanoma up-to-date  Hypertension doing well without lisinopril  Osteoporosis screening and prevention-bones well-preserved over time  Obesity without overt weight related problems  Oral/tongue/lip swelling-avoid ACE inhibitors;?  Dental related  Left low back cramping - positional/mechanical

## 2025-03-25 NOTE — PROGRESS NOTES
"Subjective   Reason for Visit: Frida Dailey is an 69 y.o. female here for a Medicare Wellness visit.          Reviewed all medications by prescribing practitioner or clinical pharmacist (such as prescriptions, OTCs, herbal therapies and supplements) and documented in the medical record.    HPI  TONGUE SWELLING limited       Patient Care Team:  Leandro Gray MD as PCP - General  Leandro Gray MD as PCP - [employer] PCP     Review of Systems    Objective   Vitals:  /63 (BP Location: Right arm, Patient Position: Sitting)   Pulse 52   Resp 16   Ht 1.727 m (5' 8\")   Wt 124 kg (273 lb 9.6 oz)   SpO2 98%   BMI 41.60 kg/m²       Physical Exam    Assessment & Plan  Benign essential hypertension    Orders:    ECG 12 lead (Clinic Performed)    Renal Function Panel; Future    Alanine Aminotransferase; Future    Aspartate Aminotransferase; Future    CBC and Auto Differential; Future    Urinalysis with Reflex Microscopic; Future    Sinus bradycardia    Orders:    ECG 12 lead (Clinic Performed)    TSH with reflex to Free T4 if abnormal; Future    Encounter for hepatitis C screening test for low risk patient    Orders:    Hepatitis C Antibody; Future    Hypercholesterolemia         Prediabetes    Orders:    Hemoglobin A1C; Future    Routine general medical examination at a health care facility    Orders:    Hepatitis C Antibody; Future    Renal Function Panel; Future    Alanine Aminotransferase; Future    Aspartate Aminotransferase; Future    Hemoglobin A1C; Future    TSH with reflex to Free T4 if abnormal; Future    CBC and Auto Differential; Future    Urinalysis with Reflex Microscopic; Future         {AWV Counseling (Optional):21968}     " "disturbance. The patient is not nervous/anxious.        Objective   Vitals:  /70   Pulse 56   Resp 16   Ht 1.727 m (5' 8\")   Wt 125 kg (275 lb)   SpO2 98%   BMI 41.81 kg/m²       Physical Exam  Constitutional:       General: She is not in acute distress.     Appearance: Normal appearance. She is obese.   HENT:      Head: Normocephalic.      Right Ear: Hearing and tympanic membrane normal.      Left Ear: Hearing and tympanic membrane normal.      Ears:      Comments: Speech and finger rub     Nose: Nose normal.      Mouth/Throat:      Mouth: Mucous membranes are moist.      Pharynx: Oropharynx is clear.   Eyes:      General: No scleral icterus.     Extraocular Movements: Extraocular movements intact.      Conjunctiva/sclera: Conjunctivae normal.   Neck:      Thyroid: No thyromegaly.      Vascular: No carotid bruit or JVD.      Comments: Surgical scar  No JVD or thyromegaly, carotid upstroke normal  Cardiovascular:      Rate and Rhythm: Normal rate and regular rhythm.      Pulses: Normal pulses.      Heart sounds: Normal heart sounds. No murmur heard.  Pulmonary:      Effort: Pulmonary effort is normal.      Breath sounds: Normal breath sounds. No wheezing, rhonchi or rales.   Abdominal:      General: Abdomen is flat. Bowel sounds are normal. There is no distension.      Palpations: Abdomen is soft. There is no mass.      Tenderness: There is no abdominal tenderness. There is no right CVA tenderness, left CVA tenderness or guarding.      Hernia: No hernia is present.   Genitourinary:     Comments: No breast pelvic or rectal exam  Musculoskeletal:         General: Normal range of motion.      Cervical back: Full passive range of motion without pain. No tenderness.      Right lower leg: No edema.      Left lower leg: No edema.      Comments: Joints F ROM, T0 L0 S0   Lymphadenopathy:      Cervical: No cervical adenopathy.   Skin:     General: Skin is warm.      Capillary Refill: Capillary refill takes less " than 2 seconds.      Findings: No lesion or rash.      Comments: Surgical scars prominent left upper arm   Neurological:      General: No focal deficit present.      Mental Status: She is alert and oriented to person, place, and time.      Cranial Nerves: No cranial nerve deficit.      Sensory: No sensory deficit.      Motor: No weakness.      Coordination: Coordination normal.      Gait: Gait normal.      Deep Tendon Reflexes: Reflexes normal.      Comments: Right handed   Psychiatric:         Mood and Affect: Mood normal.         Behavior: Behavior normal.         Thought Content: Thought content normal.     Data  Electrocardiogram 3/25/2025 normal sinus rhythm/sinus bradycardia  Lab 9/11/2024 lipid at target other than HDL C39  1/29/2024 A1c 5.9%, glucose 121, creatinine 0.9/GFR 71  CT cardiac score 7/8/2021 equals 85  Colonoscopy 5/8/2024 11/22/2019, 2014, 2009, 2004  Colon polyp 5/8/2024 hyperplastic  Mammogram 6/7/2024   bone densitometry June 2021, 2017    Assessment & Plan  Benign essential hypertension    Orders:    ECG 12 lead (Clinic Performed)    Renal Function Panel; Future    Alanine Aminotransferase; Future    Aspartate Aminotransferase; Future    CBC and Auto Differential; Future    Urinalysis with Reflex Microscopic; Future    hydrALAZINE (Apresoline) 50 mg tablet; Take 1 tablet (50 mg) by mouth 2 times a day.    Sinus bradycardia    Orders:    ECG 12 lead (Clinic Performed)    TSH with reflex to Free T4 if abnormal; Future    Encounter for hepatitis C screening test for low risk patient    Orders:    Hepatitis C Antibody; Future    Hypercholesterolemia         Prediabetes    Orders:    Hemoglobin A1C; Future    Routine general medical examination at a health care facility    Orders:    Hepatitis C Antibody; Future    Renal Function Panel; Future    Alanine Aminotransferase; Future    Aspartate Aminotransferase; Future    Hemoglobin A1C; Future    TSH with reflex to Free T4 if abnormal; Future     CBC and Auto Differential; Future    Urinalysis with Reflex Microscopic; Future    Severe obesity (BMI >= 40) (Multi)         Basal cell carcinoma of skin of other parts of face         Malignant melanoma of upper extremity, unspecified laterality         Angioedema, subsequent encounter       Overall you are in good health today; age and gender appropriate wellness services reviewed  You have no clinical evidence of active heart disease, risk factors are at target levels (glucose, BP, Cholesterol)  Sinus bradycardia slow natural pacemaker rhythm heart, indicative of healthy heart function  Prior coronary artery calcium CT score associated with lower risk of coronary heart disease  Age and gender appropriate cancer screening prevention will be up-to-date with Frida mammogram  Colonoscopy colon polyps all hyperplastic without precancerous potential and diverticulosis  Skin care including squamous cell cancer and melanoma up-to-date  Hypertension doing well without lisinopril; avoid ACE inhibitors  Osteoporosis screening and prevention-bones well-preserved over time

## 2025-03-27 LAB
ALBUMIN SERPL-MCNC: 4.1 G/DL (ref 3.6–5.1)
ALT SERPL-CCNC: 24 U/L (ref 6–29)
APPEARANCE UR: CLEAR
AST SERPL-CCNC: 24 U/L (ref 10–35)
BACTERIA #/AREA URNS HPF: ABNORMAL /HPF
BASOPHILS # BLD AUTO: 39 CELLS/UL (ref 0–200)
BASOPHILS NFR BLD AUTO: 0.6 %
BILIRUB UR QL STRIP: NEGATIVE
BUN SERPL-MCNC: 19 MG/DL (ref 7–25)
BUN/CREAT SERPL: ABNORMAL (CALC) (ref 6–22)
CALCIUM SERPL-MCNC: 9.6 MG/DL (ref 8.6–10.4)
CHLORIDE SERPL-SCNC: 104 MMOL/L (ref 98–110)
CO2 SERPL-SCNC: 25 MMOL/L (ref 20–32)
COLOR UR: YELLOW
CREAT SERPL-MCNC: 0.82 MG/DL (ref 0.5–1.05)
EGFRCR SERPLBLD CKD-EPI 2021: 78 ML/MIN/1.73M2
EOSINOPHIL # BLD AUTO: 130 CELLS/UL (ref 15–500)
EOSINOPHIL NFR BLD AUTO: 2 %
ERYTHROCYTE [DISTWIDTH] IN BLOOD BY AUTOMATED COUNT: 12.7 % (ref 11–15)
EST. AVERAGE GLUCOSE BLD GHB EST-MCNC: 148 MG/DL
EST. AVERAGE GLUCOSE BLD GHB EST-SCNC: 8.2 MMOL/L
GLUCOSE SERPL-MCNC: 135 MG/DL (ref 65–99)
GLUCOSE UR QL STRIP: NEGATIVE
HBA1C MFR BLD: 6.8 % OF TOTAL HGB
HCT VFR BLD AUTO: 37.1 % (ref 35–45)
HCV AB SERPL QL IA: REACTIVE
HCV RNA SERPL NAA+PROBE-ACNC: ABNORMAL IU/ML
HCV RNA SERPL NAA+PROBE-LOG IU: ABNORMAL LOG IU/ML
HGB BLD-MCNC: 12 G/DL (ref 11.7–15.5)
HGB UR QL STRIP: NEGATIVE
HYALINE CASTS #/AREA URNS LPF: ABNORMAL /LPF
KETONES UR QL STRIP: NEGATIVE
LEUKOCYTE ESTERASE UR QL STRIP: ABNORMAL
LYMPHOCYTES # BLD AUTO: 1885 CELLS/UL (ref 850–3900)
LYMPHOCYTES NFR BLD AUTO: 29 %
MCH RBC QN AUTO: 30.5 PG (ref 27–33)
MCHC RBC AUTO-ENTMCNC: 32.3 G/DL (ref 32–36)
MCV RBC AUTO: 94.2 FL (ref 80–100)
MONOCYTES # BLD AUTO: 338 CELLS/UL (ref 200–950)
MONOCYTES NFR BLD AUTO: 5.2 %
NEUTROPHILS # BLD AUTO: 4108 CELLS/UL (ref 1500–7800)
NEUTROPHILS NFR BLD AUTO: 63.2 %
NITRITE UR QL STRIP: NEGATIVE
PH UR STRIP: ABNORMAL [PH] (ref 5–8)
PHOSPHATE SERPL-MCNC: 4 MG/DL (ref 2.1–4.3)
PLATELET # BLD AUTO: 258 THOUSAND/UL (ref 140–400)
PMV BLD REES-ECKER: 9.5 FL (ref 7.5–12.5)
POTASSIUM SERPL-SCNC: 3.6 MMOL/L (ref 3.5–5.3)
PROT UR QL STRIP: NEGATIVE
QUEST HCV PCR (ALWAYS MESSAGE): ABNORMAL
RBC # BLD AUTO: 3.94 MILLION/UL (ref 3.8–5.1)
RBC #/AREA URNS HPF: ABNORMAL /HPF
SERVICE CMNT-IMP: ABNORMAL
SODIUM SERPL-SCNC: 143 MMOL/L (ref 135–146)
SP GR UR STRIP: 1.02 (ref 1–1.03)
SQUAMOUS #/AREA URNS HPF: ABNORMAL /HPF
TSH SERPL-ACNC: 1.76 MIU/L (ref 0.4–4.5)
WBC # BLD AUTO: 6.5 THOUSAND/UL (ref 3.8–10.8)
WBC #/AREA URNS HPF: ABNORMAL /HPF

## 2025-03-31 DIAGNOSIS — R76.8 POSITIVE HEPATITIS C ANTIBODY TEST: Primary | ICD-10-CM

## 2025-04-11 PROBLEM — M72.2 PLANTAR FASCIITIS: Status: ACTIVE | Noted: 2023-07-17

## 2025-04-11 PROBLEM — M81.0 OSTEOPOROSIS: Status: RESOLVED | Noted: 2023-07-17 | Resolved: 2025-04-11

## 2025-04-11 PROBLEM — R73.03 PREDIABETES: Status: RESOLVED | Noted: 2023-07-17 | Resolved: 2025-04-11

## 2025-04-11 PROBLEM — Z00.00 ROUTINE GENERAL MEDICAL EXAMINATION AT A HEALTH CARE FACILITY: Status: ACTIVE | Noted: 2025-04-11

## 2025-04-11 PROBLEM — B02.9 HERPES ZOSTER: Status: RESOLVED | Noted: 2023-07-17 | Resolved: 2025-04-11

## 2025-04-11 PROBLEM — M85.80 OSTEOPENIA: Status: RESOLVED | Noted: 2023-07-17 | Resolved: 2025-04-11

## 2025-04-11 PROBLEM — T78.3XXA ANGIO-EDEMA: Status: ACTIVE | Noted: 2025-04-11

## 2025-04-11 PROBLEM — R00.1 SINUS BRADYCARDIA: Status: ACTIVE | Noted: 2025-04-11

## 2025-04-11 PROBLEM — E66.01 SEVERE OBESITY (BMI >= 40) (MULTI): Status: ACTIVE | Noted: 2023-07-17

## 2025-04-11 PROBLEM — M75.80 ROTATOR CUFF TENDINITIS: Status: ACTIVE | Noted: 2023-07-17

## 2025-04-11 PROBLEM — B37.2 INTERTRIGINOUS CANDIDIASIS: Status: RESOLVED | Noted: 2023-07-17 | Resolved: 2025-04-11

## 2025-04-11 ASSESSMENT — ENCOUNTER SYMPTOMS
NERVOUS/ANXIOUS: 0
SORE THROAT: 0
DYSPHORIC MOOD: 0
POLYDIPSIA: 0
VOICE CHANGE: 0
CONSTIPATION: 0
DYSURIA: 0
MUSCULOSKELETAL NEGATIVE: 1
PALPITATIONS: 0
SHORTNESS OF BREATH: 0
ABDOMINAL PAIN: 0
FATIGUE: 0
SPEECH DIFFICULTY: 0
WEAKNESS: 0
NUMBNESS: 0
NAUSEA: 0
HEMATURIA: 0
POLYPHAGIA: 0
DIARRHEA: 0
DIZZINESS: 0
FEVER: 0
TREMORS: 0
ARTHRALGIAS: 0
COUGH: 0
CONFUSION: 0
DECREASED CONCENTRATION: 0
TROUBLE SWALLOWING: 0
CONSTITUTIONAL NEGATIVE: 1
BACK PAIN: 0
UNEXPECTED WEIGHT CHANGE: 0
HEADACHES: 0
BRUISES/BLEEDS EASILY: 0
VOMITING: 0
APNEA: 0
SLEEP DISTURBANCE: 0
FREQUENCY: 0
LIGHT-HEADEDNESS: 0
ENDOCRINE NEGATIVE: 1
EYES NEGATIVE: 1
RESPIRATORY NEGATIVE: 1
WOUND: 0

## 2025-04-11 ASSESSMENT — ACTIVITIES OF DAILY LIVING (ADL)
DRESSING: INDEPENDENT
TAKING_MEDICATION: INDEPENDENT
BATHING: INDEPENDENT
MANAGING_FINANCES: INDEPENDENT
DOING_HOUSEWORK: INDEPENDENT
GROCERY_SHOPPING: INDEPENDENT

## 2025-04-11 NOTE — ASSESSMENT & PLAN NOTE
Orders:    Hepatitis C Antibody; Future    Renal Function Panel; Future    Alanine Aminotransferase; Future    Aspartate Aminotransferase; Future    Hemoglobin A1C; Future    TSH with reflex to Free T4 if abnormal; Future    CBC and Auto Differential; Future    Urinalysis with Reflex Microscopic; Future

## 2025-04-11 NOTE — ASSESSMENT & PLAN NOTE
Overall you are in good health today; age and gender appropriate wellness services reviewed  You have no clinical evidence of active heart disease, risk factors are at target levels (glucose, BP, Cholesterol)  Sinus bradycardia slow natural pacemaker rhythm heart, indicative of healthy heart function  Prior coronary artery calcium CT score associated with lower risk of coronary heart disease  Age and gender appropriate cancer screening prevention will be up-to-date with Frida mammogram  Colonoscopy colon polyps all hyperplastic without precancerous potential and diverticulosis  Skin care including squamous cell cancer and melanoma up-to-date  Hypertension doing well without lisinopril; avoid ACE inhibitors  Osteoporosis screening and prevention-bones well-preserved over time

## 2025-06-15 DIAGNOSIS — I10 ESSENTIAL (PRIMARY) HYPERTENSION: ICD-10-CM

## 2025-06-17 ENCOUNTER — APPOINTMENT (OUTPATIENT)
Dept: PRIMARY CARE | Facility: CLINIC | Age: 70
End: 2025-06-17
Payer: MEDICARE

## 2025-06-17 VITALS
HEART RATE: 60 BPM | HEIGHT: 68 IN | SYSTOLIC BLOOD PRESSURE: 114 MMHG | WEIGHT: 274 LBS | OXYGEN SATURATION: 96 % | DIASTOLIC BLOOD PRESSURE: 74 MMHG | BODY MASS INDEX: 41.52 KG/M2

## 2025-06-17 DIAGNOSIS — E66.01 SEVERE OBESITY (BMI >= 40) (MULTI): ICD-10-CM

## 2025-06-17 DIAGNOSIS — E78.00 HYPERCHOLESTEROLEMIA: ICD-10-CM

## 2025-06-17 DIAGNOSIS — T78.40XS HYPERSENSITIVITY DISORDER, SEQUELA: ICD-10-CM

## 2025-06-17 DIAGNOSIS — C44.319 BASAL CELL CARCINOMA OF SKIN OF OTHER PARTS OF FACE: ICD-10-CM

## 2025-06-17 DIAGNOSIS — Z00.00 ROUTINE GENERAL MEDICAL EXAMINATION AT A HEALTH CARE FACILITY: Primary | ICD-10-CM

## 2025-06-17 DIAGNOSIS — C43.60 MALIGNANT MELANOMA OF UPPER EXTREMITY, UNSPECIFIED LATERALITY: ICD-10-CM

## 2025-06-17 DIAGNOSIS — E11.9 DIABETES MELLITUS WITHOUT COMPLICATION: ICD-10-CM

## 2025-06-17 DIAGNOSIS — I10 BENIGN ESSENTIAL HYPERTENSION: ICD-10-CM

## 2025-06-17 DIAGNOSIS — Z12.31 ENCOUNTER FOR SCREENING MAMMOGRAM FOR MALIGNANT NEOPLASM OF BREAST: ICD-10-CM

## 2025-06-17 RX ORDER — AMLODIPINE BESYLATE 10 MG/1
10 TABLET ORAL DAILY
Qty: 90 TABLET | Refills: 3 | Status: SHIPPED | OUTPATIENT
Start: 2025-06-17

## 2025-06-17 ASSESSMENT — ACTIVITIES OF DAILY LIVING (ADL)
BATHING: INDEPENDENT
TAKING_MEDICATION: INDEPENDENT
GROCERY_SHOPPING: INDEPENDENT
MANAGING_FINANCES: INDEPENDENT
DOING_HOUSEWORK: INDEPENDENT
DRESSING: INDEPENDENT

## 2025-06-17 ASSESSMENT — PATIENT HEALTH QUESTIONNAIRE - PHQ9
SUM OF ALL RESPONSES TO PHQ9 QUESTIONS 1 AND 2: 0
1. LITTLE INTEREST OR PLEASURE IN DOING THINGS: NOT AT ALL
2. FEELING DOWN, DEPRESSED OR HOPELESS: NOT AT ALL

## 2025-06-17 ASSESSMENT — PAIN SCALES - GENERAL: PAINLEVEL_OUTOF10: 0-NO PAIN

## 2025-06-17 ASSESSMENT — ENCOUNTER SYMPTOMS
LOSS OF SENSATION IN FEET: 0
DEPRESSION: 0
OCCASIONAL FEELINGS OF UNSTEADINESS: 0

## 2025-06-17 NOTE — PATIENT INSTRUCTIONS
Overall you are in good health today; age and gender appropriate wellness services reviewed  You have no clinical evidence of active heart disease, risk factors are at target levels (glucose, BP, Cholesterol)  Sinus bradycardia slow natural pacemaker rhythm heart, indicative of healthy heart function  Prior coronary artery calcium CT score associated with lower risk of coronary heart disease  Age and gender appropriate cancer screening prevention will be up-to-date with June mammogram  Colonoscopy colon polyps all hyperplastic without precancerous potential and diverticulosis  Skin care including squamous cell cancer and melanoma up-to-date  Blood sugar consistent with controlled diabetes mellitus, low fat/low carbohydrate diet, weight loss  Short-term symptoms of diabetes may include none, increased thirst/urination/appetite and weight loss, and blurred vision  Recheck hemoglobin A1c in 4 months  Medications available for diabetes/weight loss  Hypertension doing well without lisinopril; avoid ACE inhibitors  Osteoporosis screening and prevention-bones well-preserved over time

## 2025-06-17 NOTE — PROGRESS NOTES
"Subjective   Patient ID: Frida Dailey \"June\" is a 70 y.o. female who presents for Medicare Annual Wellness Visit Subsequent.  History of Present Illness  Frida Dailey \"June\" is a 70 year old female who presents for a routine follow-up visit.    She has experienced a significant reduction in lip swelling episodes after discontinuing lisinopril, with only one or two occurrences since stopping the medication.    She experienced soreness in her hip after an eight-hour drive to [place] for a family emergency, but describes it as manageable and not severe.    She had a tooth extraction recently due to a shattered tooth, which she attributes to aging. She is not planning to replace the tooth as it is functioning well without it.    She recently had an eye exam and was informed about incipient senile cataracts.    No current mouth sores, sore throat, or issues with chewing, speaking, or swallowing. No changes in her voice, cough, chest pain, heart palpitations, dizziness, or lightheadedness.    No abdominal symptoms, changes in bowel or bladder habits, or joint pain unless she overexerts herself. She is adjusting to increased bathroom frequency due to better hydration habits.    She has been to a dermatologist and has more scars than she would like, but is monitoring them. No skin sores, rashes, or concerning moles.    She reports being in good spirits, with no undue feelings of depression or sadness, except when watching the news.    She has not experienced any falls in the past six months and feels she is at low risk for falling.    Her current medications include amlodipine 10 mg for blood pressure, low-dose aspirin every other day, atorvastatin for cholesterol, Zyrtec and fluticasone nasal spray as needed for allergies, chlorthalidone for blood pressure, vitamin D, Colace, and an epinephrine injection for allergic reactions. She occasionally uses Lysodren for dry skin and nystatin powder as needed.    She " "has a history of controlled diabetes with an A1c under 7, and her fasting blood sugar was 135. She is working on reducing sugar intake and increasing physical activity.    Her cholesterol levels were last checked in September 2024, with a total cholesterol of 124, HDL of 39, and LDL well below 70, indicating good control.    She has a history of a CT heart calcium score of 84.4 and a solid bone density test.    She follows a relatively healthy lifestyle with regular [employer] classes, a walking group, and aims for 7-8 hours of sleep per night.    Review of Systems  ROS otherwise negative aside from what was mentioned above in HPI.    Objective     /74 (BP Location: Left arm, Patient Position: Sitting, BP Cuff Size: Large adult)   Pulse 60   Ht 1.727 m (5' 8\")   Wt 124 kg (274 lb)   SpO2 96%   BMI 41.66 kg/m²      Current Outpatient Medications   Medication Instructions    amLODIPine (NORVASC) 10 mg, oral, Daily, for blood pressure    ammonium lactate (Lac-Hydrin) 12 % lotion 1 Application, As needed    aspirin 81 mg, Every 48 hours    atorvastatin (Lipitor) 20 mg tablet 1 tablet, Daily    cetirizine (ZYRTEC) 10 mg, oral, Daily, As needed    chlorthalidone (Hygroton) 25 mg tablet TAKE 1 TABLET BY MOUTH EVERY DAY    cholecalciferol (VITAMIN D-3) 1,000 Units, Daily RT    docusate sodium (COLACE) 100 mg    EPINEPHrine (EPIPEN) 0.3 mg, intramuscular, As Directed    flaxseed oiL 1,000 mg, 2 times daily    fluticasone (Flonase) 50 mcg/actuation nasal spray 1 spray, Daily    hydrALAZINE (APRESOLINE) 50 mg, oral, 2 times daily    metoprolol succinate XL (Toprol-XL) 200 mg 24 hr tablet TAKE 1 TABLET BY MOUTH EVERY DAY    nystatin (Mycostatin) 100,000 unit/gram powder 1 Application, As needed       Physical Exam  Constitutional:       General: She is not in acute distress.     Appearance: She is obese.      Comments: See 3/25/25 exam    Cardiovascular:      Rate and Rhythm: Normal rate and regular rhythm.      Pulses: " Normal pulses.      Heart sounds: Normal heart sounds. No murmur heard.  Pulmonary:      Breath sounds: Normal breath sounds.   Neurological:      Mental Status: She is alert and oriented to person, place, and time. Mental status is at baseline.      Gait: Gait normal.   Psychiatric:         Mood and Affect: Mood normal.       Physical Exam  MEASUREMENTS: Weight- 273.     Wt Readings from Last 5 Encounters:   06/17/25 124 kg (274 lb)   03/25/25 125 kg (275 lb)   12/19/24 117 kg (259 lb)   11/21/24 118 kg (260 lb)   07/16/24 123 kg (272 lb)      BP Readings from Last 5 Encounters:   06/17/25 114/74   03/25/25 130/70   12/19/24 140/76   11/21/24 113/68   07/16/24 126/70      Results  LABS  HbA1c: 6.5% (03/2025)  Fasting Blood Glucose: 135 mg/dL (03/2025)  Total Cholesterol: 124 mg/dL (09/2024)  HDL: 39 mg/dL (09/2024)  WBC: within normal limits (03/2025)  RBC: within normal limits (03/2025)  Platelets: within normal limits (03/2025)  TSH: within normal limits (03/2025)  AST: within normal limits (03/2025)  ALT: within normal limits (03/2025)  Electrocardiogram 3/25/2025 normal sinus rhythm/sinus bradycardia  Lab 9/11/2024 lipid at target other than HDL C39  1/29/2024 A1c 5.9%, glucose 121, creatinine 0.9/GFR 71  CT cardiac score 7/8/2021 equals 85  Colonoscopy 5/8/2024 11/22/2019, 2014, 2009, 2004  Colon polyp 5/8/2024 hyperplastic  Mammogram 6/7/2024   bone densitometry June 2021, 2017  RADIOLOGY  LAD Calcium Score: 35 (2021)  CT Heart Calcium Score: 84.4 (2021)  Bone Density: normal (2021)    Assessment & Plan  Angioedema  Episodes of lip swelling decreased after stopping lisinopril, indicating it as the likely cause. Expected to resolve over time.  - Educated to avoid lisinopril and similar medications.    Diabetes Mellitus, Type 2  Diabetes well-controlled with A1c under 7. Fasting glucose at 135 indicates mild severity. Prefers no additional medications.  - Continue current diabetes management plan.  - Encourage  lifestyle modifications to maintain blood sugar control.    Hypertension  Current regimen with amlodipine, chlorthalidone, and metoprolol effectively manages blood pressure.  - Continue current antihypertensive medications: amlodipine, chlorthalidone, and metoprolol.    Hyperlipidemia  Cholesterol levels excellent with atorvastatin, total cholesterol at 124, LDL below 70.  - Continue atorvastatin for cholesterol management.    General Health Maintenance  Up to date on most immunizations. Advised to wait until fall for next COVID-19 booster. Due for mammogram this year.  - Order mammogram for this year.  - Advise waiting until fall for the next COVID-19 booster.    Goals of Care  Living will and durable power of  in place, indicating proactive planning.    Follow-up  Routine follow-up plans discussed for ongoing health maintenance and chronic condition management.  - Schedule mammogram as ordered.  - Plan for fall COVID-19 booster as discussed.    Overall you are in good health today; age and gender appropriate wellness services reviewed  You have no clinical evidence of active heart disease, risk factors are at target levels (glucose, BP, Cholesterol)  Sinus bradycardia slow natural pacemaker rhythm heart, indicative of healthy heart function  Prior coronary artery calcium CT score associated with lower risk of coronary heart disease  Age and gender appropriate cancer screening prevention will be up-to-date with June mammogram  Colonoscopy colon polyps all hyperplastic without precancerous potential and diverticulosis  Skin care including squamous cell cancer basal cell carcinorma, and melanoma up-to-date  Blood sugar consistent with controlled diabetes mellitus, low fat/low carbohydrate diet, weight loss  Recheck hemoglobin A1c in 4 months  Medications available for diabetes/weight loss  Hypertension doing well without lisinopril; avoid ACE inhibitors  Osteoporosis screening and prevention-bones  well-preserved over time      Leandro Gray MD     This medical note was created with the assistance of artificial intelligence (AI) for documentation purposes. The content has been reviewed and confirmed by the healthcare provider for accuracy and completeness. Patient consented to the use of audio recording and use of AI during their visit.

## 2025-06-19 ENCOUNTER — APPOINTMENT (OUTPATIENT)
Dept: PRIMARY CARE | Facility: CLINIC | Age: 70
End: 2025-06-19
Payer: MEDICARE

## 2025-06-28 PROBLEM — E11.9 DIABETES MELLITUS WITHOUT COMPLICATION: Status: ACTIVE | Noted: 2025-06-28

## 2025-06-28 PROBLEM — E66.3 OVERWEIGHT: Status: RESOLVED | Noted: 2023-07-17 | Resolved: 2025-06-28

## 2025-06-28 ASSESSMENT — PATIENT HEALTH QUESTIONNAIRE - PHQ9
1. LITTLE INTEREST OR PLEASURE IN DOING THINGS: NOT AT ALL
SUM OF ALL RESPONSES TO PHQ9 QUESTIONS 1 AND 2: 0
2. FEELING DOWN, DEPRESSED OR HOPELESS: NOT AT ALL

## 2025-06-30 DIAGNOSIS — I10 BENIGN ESSENTIAL HYPERTENSION: ICD-10-CM

## 2025-06-30 RX ORDER — HYDRALAZINE HYDROCHLORIDE 50 MG/1
50 TABLET, FILM COATED ORAL 2 TIMES DAILY
Qty: 180 TABLET | Refills: 1 | Status: SHIPPED | OUTPATIENT
Start: 2025-06-30

## 2025-07-01 RX ORDER — HYDRALAZINE HYDROCHLORIDE 50 MG/1
50 TABLET, FILM COATED ORAL 2 TIMES DAILY
OUTPATIENT
Start: 2025-07-01

## 2025-08-03 DIAGNOSIS — I10 ESSENTIAL (PRIMARY) HYPERTENSION: ICD-10-CM

## 2025-08-03 DIAGNOSIS — T78.2XXA ANAPHYLACTIC SHOCK, UNSPECIFIED, INITIAL ENCOUNTER: ICD-10-CM

## 2025-08-04 RX ORDER — EPINEPHRINE 0.3 MG/.3ML
INJECTION SUBCUTANEOUS
Qty: 2 EACH | Refills: 1 | Status: SHIPPED | OUTPATIENT
Start: 2025-08-04

## 2025-08-04 RX ORDER — CHLORTHALIDONE 25 MG/1
25 TABLET ORAL
Qty: 90 TABLET | Refills: 3 | Status: SHIPPED | OUTPATIENT
Start: 2025-08-04

## 2025-10-17 ENCOUNTER — APPOINTMENT (OUTPATIENT)
Dept: PRIMARY CARE | Facility: CLINIC | Age: 70
End: 2025-10-17
Payer: MEDICARE

## 2026-03-30 ENCOUNTER — APPOINTMENT (OUTPATIENT)
Dept: PRIMARY CARE | Facility: CLINIC | Age: 71
End: 2026-03-30
Payer: MEDICARE

## 2026-06-17 ENCOUNTER — APPOINTMENT (OUTPATIENT)
Dept: PRIMARY CARE | Facility: CLINIC | Age: 71
End: 2026-06-17
Payer: MEDICARE